# Patient Record
Sex: MALE | Race: ASIAN | Employment: STUDENT | URBAN - METROPOLITAN AREA
[De-identification: names, ages, dates, MRNs, and addresses within clinical notes are randomized per-mention and may not be internally consistent; named-entity substitution may affect disease eponyms.]

---

## 2017-06-04 ENCOUNTER — HOSPITAL ENCOUNTER (OUTPATIENT)
Age: 22
Discharge: HOME | End: 2017-06-04
Payer: COMMERCIAL

## 2017-06-04 VITALS
WEIGHT: 210 LBS | HEIGHT: 71 IN | RESPIRATION RATE: 20 BRPM | HEART RATE: 74 BPM | OXYGEN SATURATION: 98 % | BODY MASS INDEX: 29.4 KG/M2 | SYSTOLIC BLOOD PRESSURE: 126 MMHG | TEMPERATURE: 98 F | DIASTOLIC BLOOD PRESSURE: 77 MMHG

## 2017-06-04 DIAGNOSIS — J02.9 ACUTE PHARYNGITIS, UNSPECIFIED ETIOLOGY: Primary | ICD-10-CM

## 2017-06-04 PROCEDURE — 87081 CULTURE SCREEN ONLY: CPT | Performed by: PHYSICIAN ASSISTANT

## 2017-06-04 PROCEDURE — 99203 OFFICE O/P NEW LOW 30 MIN: CPT

## 2017-06-04 PROCEDURE — 99204 OFFICE O/P NEW MOD 45 MIN: CPT

## 2017-06-04 PROCEDURE — 87430 STREP A AG IA: CPT | Performed by: PHYSICIAN ASSISTANT

## 2017-06-04 NOTE — ED PROVIDER NOTES
Patient Seen in: [facility]    History   Patient presents with:  Sore Throat    Stated Complaint: SORE THROAT/FEVER    HPI    CHIEF COMPLAINT: sore throat     HISTORY OF PRESENT ILLNESS: Patient is a 26-year-old male who present Triage Vitals   BP 06/04/17 1458 126/77 mmHg   Pulse 06/04/17 1458 74   Resp 06/04/17 1458 20   Temp 06/04/17 1458 98.1 °F (36.7 °C)   Temp src 06/04/17 1458 Temporal   SpO2 06/04/17 1458 98 %   O2 Device 06/04/17 1458 None (Room air)       Current:/ Patient voiced understanding of the treatment plan. All questions answered.         Disposition and Plan     Clinical Impression:  Acute pharyngitis, unspecified etiology  (primary encounter diagnosis)    Disposition:  Discharge    Follow-up:  Primary Care

## (undated) NOTE — ED AVS SNAPSHOT
[facility] in [address] [place] [address] Drive,[address]    [address] [place] [address]    Phone:  [phone]    Fax:  [phone]           Mai Higueraranjithalona   MRN: [id number]    Department:  THE [facility] OF [facility] in [facility] [place]   Date of Visit:  6/4/2017           D nuestro adminstrador de nhan diaz [phone]. Expect to receive an electronic request (by e-mail or text) to complete a self-assessment the day after your visit. You may also receive a call from our patient liason soon after your visit.  Also, some [facility] ([address]) [address]  ([address]) [address] [facility]. ([address]) [address] [facility] [address] (8 not sign up before the expiration date, you must request a new code. Your unique [employer] Access Code: [email or web address]  Expires: 8/3/2017  3:20 PM    If you have questions, you can call [phone] to talk to our [facility] Staff.  Remember, Ricardo i